# Patient Record
Sex: MALE | Race: WHITE | NOT HISPANIC OR LATINO | URBAN - METROPOLITAN AREA
[De-identification: names, ages, dates, MRNs, and addresses within clinical notes are randomized per-mention and may not be internally consistent; named-entity substitution may affect disease eponyms.]

---

## 2018-09-04 ENCOUNTER — INPATIENT (INPATIENT)
Facility: HOSPITAL | Age: 53
LOS: 2 days | Discharge: HOME | End: 2018-09-07
Attending: HOSPITALIST | Admitting: HOSPITALIST
Payer: COMMERCIAL

## 2018-09-04 VITALS
OXYGEN SATURATION: 96 % | TEMPERATURE: 99 F | SYSTOLIC BLOOD PRESSURE: 179 MMHG | DIASTOLIC BLOOD PRESSURE: 137 MMHG | WEIGHT: 199.96 LBS | RESPIRATION RATE: 16 BRPM | HEART RATE: 117 BPM | HEIGHT: 71 IN

## 2018-09-04 DIAGNOSIS — K57.92 DIVERTICULITIS OF INTESTINE, PART UNSPECIFIED, WITHOUT PERFORATION OR ABSCESS WITHOUT BLEEDING: ICD-10-CM

## 2018-09-04 DIAGNOSIS — I10 ESSENTIAL (PRIMARY) HYPERTENSION: ICD-10-CM

## 2018-09-04 LAB
ALBUMIN SERPL ELPH-MCNC: 3.5 G/DL — SIGNIFICANT CHANGE UP (ref 3.3–5)
ALP SERPL-CCNC: 58 U/L — SIGNIFICANT CHANGE UP (ref 40–120)
ALT FLD-CCNC: 20 U/L — SIGNIFICANT CHANGE UP (ref 12–78)
AMYLASE P1 CFR SERPL: 41 U/L — SIGNIFICANT CHANGE UP (ref 25–115)
ANION GAP SERPL CALC-SCNC: 10 MMOL/L — SIGNIFICANT CHANGE UP (ref 5–17)
APPEARANCE UR: CLEAR — SIGNIFICANT CHANGE UP
APTT BLD: 28.8 SEC — SIGNIFICANT CHANGE UP (ref 27.5–37.4)
AST SERPL-CCNC: 7 U/L — LOW (ref 15–37)
BASOPHILS # BLD AUTO: 0.03 K/UL — SIGNIFICANT CHANGE UP (ref 0–0.2)
BASOPHILS NFR BLD AUTO: 0.2 % — SIGNIFICANT CHANGE UP (ref 0–2)
BILIRUB DIRECT SERPL-MCNC: 0.2 MG/DL — SIGNIFICANT CHANGE UP (ref 0.05–0.2)
BILIRUB INDIRECT FLD-MCNC: 0.6 MG/DL — SIGNIFICANT CHANGE UP (ref 0.2–1)
BILIRUB SERPL-MCNC: 0.8 MG/DL — SIGNIFICANT CHANGE UP (ref 0.2–1.2)
BILIRUB UR-MCNC: NEGATIVE — SIGNIFICANT CHANGE UP
BUN SERPL-MCNC: 17 MG/DL — SIGNIFICANT CHANGE UP (ref 7–23)
CALCIUM SERPL-MCNC: 9.1 MG/DL — SIGNIFICANT CHANGE UP (ref 8.5–10.1)
CHLORIDE SERPL-SCNC: 102 MMOL/L — SIGNIFICANT CHANGE UP (ref 96–108)
CK MB BLD-MCNC: 1.7 % — SIGNIFICANT CHANGE UP (ref 0–3.5)
CK MB CFR SERPL CALC: 2.3 NG/ML — SIGNIFICANT CHANGE UP (ref 0.5–3.6)
CK SERPL-CCNC: 136 U/L — SIGNIFICANT CHANGE UP (ref 26–308)
CO2 SERPL-SCNC: 26 MMOL/L — SIGNIFICANT CHANGE UP (ref 22–31)
COLOR SPEC: YELLOW — SIGNIFICANT CHANGE UP
CREAT SERPL-MCNC: 1.12 MG/DL — SIGNIFICANT CHANGE UP (ref 0.5–1.3)
DIFF PNL FLD: ABNORMAL
EOSINOPHIL # BLD AUTO: 0.04 K/UL — SIGNIFICANT CHANGE UP (ref 0–0.5)
EOSINOPHIL NFR BLD AUTO: 0.2 % — SIGNIFICANT CHANGE UP (ref 0–6)
EPI CELLS # UR: SIGNIFICANT CHANGE UP
GLUCOSE SERPL-MCNC: 114 MG/DL — HIGH (ref 70–99)
GLUCOSE UR QL: NEGATIVE MG/DL — SIGNIFICANT CHANGE UP
HCT VFR BLD CALC: 43.1 % — SIGNIFICANT CHANGE UP (ref 39–50)
HGB BLD-MCNC: 14.5 G/DL — SIGNIFICANT CHANGE UP (ref 13–17)
IMM GRANULOCYTES NFR BLD AUTO: 0.4 % — SIGNIFICANT CHANGE UP (ref 0–1.5)
INR BLD: 1.3 RATIO — HIGH (ref 0.88–1.16)
KETONES UR-MCNC: NEGATIVE — SIGNIFICANT CHANGE UP
LEUKOCYTE ESTERASE UR-ACNC: NEGATIVE — SIGNIFICANT CHANGE UP
LIDOCAIN IGE QN: 91 U/L — SIGNIFICANT CHANGE UP (ref 73–393)
LYMPHOCYTES # BLD AUTO: 1.4 K/UL — SIGNIFICANT CHANGE UP (ref 1–3.3)
LYMPHOCYTES # BLD AUTO: 7.8 % — LOW (ref 13–44)
MCHC RBC-ENTMCNC: 29.9 PG — SIGNIFICANT CHANGE UP (ref 27–34)
MCHC RBC-ENTMCNC: 33.6 GM/DL — SIGNIFICANT CHANGE UP (ref 32–36)
MCV RBC AUTO: 88.9 FL — SIGNIFICANT CHANGE UP (ref 80–100)
MONOCYTES # BLD AUTO: 1.38 K/UL — HIGH (ref 0–0.9)
MONOCYTES NFR BLD AUTO: 7.7 % — SIGNIFICANT CHANGE UP (ref 2–14)
NEUTROPHILS # BLD AUTO: 14.91 K/UL — HIGH (ref 1.8–7.4)
NEUTROPHILS NFR BLD AUTO: 83.7 % — HIGH (ref 43–77)
NITRITE UR-MCNC: NEGATIVE — SIGNIFICANT CHANGE UP
NRBC # BLD: 0 /100 WBCS — SIGNIFICANT CHANGE UP (ref 0–0)
PH UR: 5 — SIGNIFICANT CHANGE UP (ref 5–8)
PLATELET # BLD AUTO: 239 K/UL — SIGNIFICANT CHANGE UP (ref 150–400)
POTASSIUM SERPL-MCNC: 3.9 MMOL/L — SIGNIFICANT CHANGE UP (ref 3.5–5.3)
POTASSIUM SERPL-SCNC: 3.9 MMOL/L — SIGNIFICANT CHANGE UP (ref 3.5–5.3)
PROT SERPL-MCNC: 7.9 GM/DL — SIGNIFICANT CHANGE UP (ref 6–8.3)
PROT UR-MCNC: 30 MG/DL
PROTHROM AB SERPL-ACNC: 14.2 SEC — HIGH (ref 9.8–12.7)
RBC # BLD: 4.85 M/UL — SIGNIFICANT CHANGE UP (ref 4.2–5.8)
RBC # FLD: 13.2 % — SIGNIFICANT CHANGE UP (ref 10.3–14.5)
RBC CASTS # UR COMP ASSIST: ABNORMAL /HPF (ref 0–4)
SODIUM SERPL-SCNC: 138 MMOL/L — SIGNIFICANT CHANGE UP (ref 135–145)
SP GR SPEC: 1.01 — SIGNIFICANT CHANGE UP (ref 1.01–1.02)
TROPONIN I SERPL-MCNC: <.015 NG/ML — SIGNIFICANT CHANGE UP (ref 0.01–0.04)
UROBILINOGEN FLD QL: NEGATIVE MG/DL — SIGNIFICANT CHANGE UP
WBC # BLD: 17.84 K/UL — HIGH (ref 3.8–10.5)
WBC # FLD AUTO: 17.84 K/UL — HIGH (ref 3.8–10.5)
WBC UR QL: SIGNIFICANT CHANGE UP

## 2018-09-04 PROCEDURE — 93010 ELECTROCARDIOGRAM REPORT: CPT

## 2018-09-04 PROCEDURE — 99285 EMERGENCY DEPT VISIT HI MDM: CPT | Mod: 25

## 2018-09-04 PROCEDURE — 71045 X-RAY EXAM CHEST 1 VIEW: CPT | Mod: 26

## 2018-09-04 PROCEDURE — 74177 CT ABD & PELVIS W/CONTRAST: CPT | Mod: 26

## 2018-09-04 PROCEDURE — 70450 CT HEAD/BRAIN W/O DYE: CPT | Mod: 26

## 2018-09-04 PROCEDURE — 99053 MED SERV 10PM-8AM 24 HR FAC: CPT

## 2018-09-04 RX ORDER — IBUPROFEN 200 MG
1 TABLET ORAL
Qty: 40 | Refills: 0 | OUTPATIENT
Start: 2018-09-04 | End: 2018-09-13

## 2018-09-04 RX ORDER — CIPROFLOXACIN LACTATE 400MG/40ML
400 VIAL (ML) INTRAVENOUS EVERY 12 HOURS
Qty: 0 | Refills: 0 | Status: DISCONTINUED | OUTPATIENT
Start: 2018-09-04 | End: 2018-09-05

## 2018-09-04 RX ORDER — ONDANSETRON 8 MG/1
4 TABLET, FILM COATED ORAL ONCE
Qty: 0 | Refills: 0 | Status: COMPLETED | OUTPATIENT
Start: 2018-09-04 | End: 2018-09-04

## 2018-09-04 RX ORDER — METOPROLOL TARTRATE 50 MG
1 TABLET ORAL
Qty: 120 | Refills: 0 | OUTPATIENT
Start: 2018-09-04 | End: 2018-11-02

## 2018-09-04 RX ORDER — MOXIFLOXACIN HYDROCHLORIDE TABLETS, 400 MG 400 MG/1
1 TABLET, FILM COATED ORAL
Qty: 14 | Refills: 0 | OUTPATIENT
Start: 2018-09-04 | End: 2018-09-10

## 2018-09-04 RX ORDER — METRONIDAZOLE 500 MG
500 TABLET ORAL ONCE
Qty: 0 | Refills: 0 | Status: COMPLETED | OUTPATIENT
Start: 2018-09-04 | End: 2018-09-04

## 2018-09-04 RX ORDER — LABETALOL HCL 100 MG
10 TABLET ORAL ONCE
Qty: 0 | Refills: 0 | Status: COMPLETED | OUTPATIENT
Start: 2018-09-04 | End: 2018-09-04

## 2018-09-04 RX ORDER — SODIUM CHLORIDE 9 MG/ML
1000 INJECTION INTRAMUSCULAR; INTRAVENOUS; SUBCUTANEOUS ONCE
Qty: 0 | Refills: 0 | Status: COMPLETED | OUTPATIENT
Start: 2018-09-04 | End: 2018-09-04

## 2018-09-04 RX ORDER — METRONIDAZOLE 500 MG
1 TABLET ORAL
Qty: 28 | Refills: 0 | OUTPATIENT
Start: 2018-09-04 | End: 2018-09-10

## 2018-09-04 RX ORDER — NICOTINE POLACRILEX 2 MG
1 GUM BUCCAL DAILY
Qty: 0 | Refills: 0 | Status: DISCONTINUED | OUTPATIENT
Start: 2018-09-04 | End: 2018-09-07

## 2018-09-04 RX ORDER — IBUPROFEN 200 MG
600 TABLET ORAL ONCE
Qty: 0 | Refills: 0 | Status: COMPLETED | OUTPATIENT
Start: 2018-09-04 | End: 2018-09-04

## 2018-09-04 RX ORDER — CIPROFLOXACIN LACTATE 400MG/40ML
400 VIAL (ML) INTRAVENOUS ONCE
Qty: 0 | Refills: 0 | Status: COMPLETED | OUTPATIENT
Start: 2018-09-04 | End: 2018-09-04

## 2018-09-04 RX ORDER — MORPHINE SULFATE 50 MG/1
4 CAPSULE, EXTENDED RELEASE ORAL ONCE
Qty: 0 | Refills: 0 | Status: DISCONTINUED | OUTPATIENT
Start: 2018-09-04 | End: 2018-09-04

## 2018-09-04 RX ORDER — HYDRALAZINE HCL 50 MG
10 TABLET ORAL ONCE
Qty: 0 | Refills: 0 | Status: COMPLETED | OUTPATIENT
Start: 2018-09-04 | End: 2018-09-04

## 2018-09-04 RX ORDER — MORPHINE SULFATE 50 MG/1
2 CAPSULE, EXTENDED RELEASE ORAL EVERY 4 HOURS
Qty: 0 | Refills: 0 | Status: DISCONTINUED | OUTPATIENT
Start: 2018-09-04 | End: 2018-09-05

## 2018-09-04 RX ORDER — METRONIDAZOLE 500 MG
500 TABLET ORAL EVERY 8 HOURS
Qty: 0 | Refills: 0 | Status: DISCONTINUED | OUTPATIENT
Start: 2018-09-04 | End: 2018-09-07

## 2018-09-04 RX ADMIN — MORPHINE SULFATE 4 MILLIGRAM(S): 50 CAPSULE, EXTENDED RELEASE ORAL at 09:21

## 2018-09-04 RX ADMIN — MORPHINE SULFATE 4 MILLIGRAM(S): 50 CAPSULE, EXTENDED RELEASE ORAL at 10:22

## 2018-09-04 RX ADMIN — Medication 200 MILLIGRAM(S): at 21:03

## 2018-09-04 RX ADMIN — Medication 100 MILLIGRAM(S): at 11:24

## 2018-09-04 RX ADMIN — Medication 400 MILLIGRAM(S): at 11:23

## 2018-09-04 RX ADMIN — SODIUM CHLORIDE 1000 MILLILITER(S): 9 INJECTION INTRAMUSCULAR; INTRAVENOUS; SUBCUTANEOUS at 08:11

## 2018-09-04 RX ADMIN — ONDANSETRON 4 MILLIGRAM(S): 8 TABLET, FILM COATED ORAL at 08:11

## 2018-09-04 RX ADMIN — ONDANSETRON 4 MILLIGRAM(S): 8 TABLET, FILM COATED ORAL at 10:22

## 2018-09-04 RX ADMIN — Medication 600 MILLIGRAM(S): at 16:20

## 2018-09-04 RX ADMIN — MORPHINE SULFATE 4 MILLIGRAM(S): 50 CAPSULE, EXTENDED RELEASE ORAL at 11:23

## 2018-09-04 RX ADMIN — Medication 600 MILLIGRAM(S): at 17:51

## 2018-09-04 RX ADMIN — SODIUM CHLORIDE 1000 MILLILITER(S): 9 INJECTION INTRAMUSCULAR; INTRAVENOUS; SUBCUTANEOUS at 09:21

## 2018-09-04 RX ADMIN — MORPHINE SULFATE 4 MILLIGRAM(S): 50 CAPSULE, EXTENDED RELEASE ORAL at 08:10

## 2018-09-04 RX ADMIN — Medication 10 MILLIGRAM(S): at 15:22

## 2018-09-04 RX ADMIN — Medication 100 MILLIGRAM(S): at 10:20

## 2018-09-04 RX ADMIN — Medication 100 MILLIGRAM(S): at 22:26

## 2018-09-04 RX ADMIN — Medication 10 MILLIGRAM(S): at 08:11

## 2018-09-04 RX ADMIN — Medication 10 MILLIGRAM(S): at 10:21

## 2018-09-04 RX ADMIN — Medication 500 MILLIGRAM(S): at 13:42

## 2018-09-04 NOTE — ED ADULT NURSE NOTE - OBJECTIVE STATEMENT
patient received, came here for lower abd pain x 3 days, LBM saturday, denies n/v, denies dizziness, denies burning/painful urination

## 2018-09-04 NOTE — ED ADULT NURSE NOTE - CHIEF COMPLAINT QUOTE
pt c/o lower abd pain since Sunday. denies N/V/D.   pt stated he felt bloated.  last regular BM was Saturday.

## 2018-09-04 NOTE — H&P ADULT - NSHPREVIEWOFSYSTEMS_GEN_ALL_CORE

## 2018-09-04 NOTE — H&P ADULT - HISTORY OF PRESENT ILLNESS
53 year old male presents today c/o abdominal pain which started since Sunday, he describes pain rated 7/10 in his lower abdomen associated with bloating and chills  (-) nausea or vomiting (-) h/o kidney stones (-) hematuria (-) urinary symptoms (-) fevers or chills 53 year old male presents today c/o abdominal pain which started since Sunday, he describes pain rated 7/10 in his lower abdomen associated with bloating and chills  (-) nausea or vomiting (-) h/o kidney stones (-) hematuria (-) urinary symptoms (-) fevers or chills-n the emergency room he was found to have markedly elevated b/p - denies chest pain or short of breath or headache or bright red blood per rectum

## 2018-09-04 NOTE — ED ADULT NURSE NOTE - NSIMPLEMENTINTERV_GEN_ALL_ED
Implemented All Universal Safety Interventions:  [place] to call system. Call bell, personal items and telephone within reach. Instruct patient to call for assistance. Room bathroom lighting operational. Non-slip footwear when patient is off stretcher. Physically safe environment: no spills, clutter or unnecessary equipment. Stretcher in lowest position, wheels locked, appropriate side rails in place.

## 2018-09-04 NOTE — H&P ADULT - ASSESSMENT
53m with diverticultis     IMPROVE VTE Individual Risk Assessment          RISK                                                          Points    [  ] Previous VTE                                                3    [  ] Thrombophilia                                             2    [  ] Lower limb paralysis                                    2        (unable to hold up >15 seconds)      [  ] Current Cancer                                             2         (within 6 months)    [  ] Immobilization > 24 hrs                              1    [  ] ICU/CCU stay > 24 hours                            1    [  ] Age > 60                                                    1    IMPROVE VTE Score __0_______

## 2018-09-04 NOTE — H&P ADULT - NSHPLABSRESULTS_GEN_ALL_CORE
14.5   17.84 )-----------( 239      ( 04 Sep 2018 07:52 )             43.1   09-04    138  |  102  |  17  ----------------------------<  114<H>  3.9   |  26  |  1.12    Ca    9.1      04 Sep 2018 07:52    TPro  7.9  /  Alb  3.5  /  TBili  0.8  /  DBili  .20  /  AST  7<L>  /  ALT  20  /  AlkPhos  58  09-04  < from: Xray Chest 1 View AP/PA. (09.04.18 @ 11:27) >    IMPRESSION:   No infiltrates.  < from: CT Abdomen and Pelvis w/ IV Cont (09.04.18 @ 09:35) >      Sigmoid colon diverticulitis with significant surrounding inflammatory   changes and reactive wall thickening of the cecum and terminal/distal   ileum. No drainable fluid collection or intraperitoneal free air.

## 2018-09-04 NOTE — ED PROVIDER NOTE - OBJECTIVE STATEMENT
53 year old male presents today c/o abdominal pain which started since Sunday, he describes pain rated 7/10 in his lower abdomen associated with bloating and chills  (-) nausea or vomiting (-) h/o kidney stones (-) hematuria (-) urinary symptoms (-) fevers or chills

## 2018-09-04 NOTE — H&P ADULT - NSHPPHYSICALEXAM_GEN_ALL_CORE
GENERAL: NAD well-developed  HEAD:  Atraumatic, Normocephalic  EYES: EOMI, PERRLA, conjunctiva and sclera clear  ENMT: No tonsillar erythema, exudates, or enlargement; Moist mucous membranes, Good dentition, No lesions  NECK: Supple, No JVD, Normal thyroid  NERVOUS SYSTEM:  Alert & Oriented X3, Good concentration; Motor Strength 5/5 B/L upper and lower extremities; DTRs 2+ intact and symmetric  CHEST/LUNG: Clear to percussion bilaterally; No rales, rhonchi, wheezing, or rubs  HEART: Regular rate and rhythm; No murmurs, rubs, or gallops  ABDOMEN: Soft, Nontender, Nondistended; Bowel sounds present  EXTREMITIES:  2+ Peripheral Pulses, No clubbing, cyanosis, or edema  LYMPH: No lymphadenopathy   SKIN: No rashes or lesions GENERAL: NAD well-developed  HEAD:  Atraumatic, Normocephalic  EYES: EOMI, PERRLA, conjunctiva and sclera clear  ENMT: No tonsillar erythema, exudates, or enlargement; Moist mucous membranes, Good dentition, No lesions  NECK: Supple, No JVD, Normal thyroid  NERVOUS SYSTEM:  Alert & Oriented X3, Good concentration; Motor Strength 5/5 B/L upper and lower extremities; DTRs 2+ intact and symmetric  CHEST/LUNG: Clear to percussion bilaterally; No rales, rhonchi, wheezing, or rubs  HEART: Regular rate and rhythm; No murmurs, rubs, or gallops  ABDOMEN: Soft, mildly tender to left lower quadrant , Nondistended; Bowel sounds present  EXTREMITIES:  2+ Peripheral Pulses, No clubbing, cyanosis, or edema  LYMPH: No lymphadenopathy   SKIN: No rashes or lesions

## 2018-09-05 DIAGNOSIS — Z29.9 ENCOUNTER FOR PROPHYLACTIC MEASURES, UNSPECIFIED: ICD-10-CM

## 2018-09-05 LAB
ALBUMIN SERPL ELPH-MCNC: 3 G/DL — LOW (ref 3.3–5)
ALP SERPL-CCNC: 49 U/L — SIGNIFICANT CHANGE UP (ref 40–120)
ALT FLD-CCNC: 16 U/L — SIGNIFICANT CHANGE UP (ref 12–78)
ANION GAP SERPL CALC-SCNC: 8 MMOL/L — SIGNIFICANT CHANGE UP (ref 5–17)
ANION GAP SERPL CALC-SCNC: 9 MMOL/L — SIGNIFICANT CHANGE UP (ref 5–17)
ANISOCYTOSIS BLD QL: SLIGHT — SIGNIFICANT CHANGE UP
APPEARANCE UR: CLEAR — SIGNIFICANT CHANGE UP
AST SERPL-CCNC: 5 U/L — LOW (ref 15–37)
BACTERIA # UR AUTO: ABNORMAL
BASOPHILS # BLD AUTO: 0 K/UL — SIGNIFICANT CHANGE UP (ref 0–0.2)
BASOPHILS NFR BLD AUTO: 0 % — SIGNIFICANT CHANGE UP (ref 0–2)
BILIRUB SERPL-MCNC: 0.7 MG/DL — SIGNIFICANT CHANGE UP (ref 0.2–1.2)
BILIRUB UR-MCNC: NEGATIVE — SIGNIFICANT CHANGE UP
BUN SERPL-MCNC: 15 MG/DL — SIGNIFICANT CHANGE UP (ref 7–23)
BUN SERPL-MCNC: 16 MG/DL — SIGNIFICANT CHANGE UP (ref 7–23)
CALCIUM SERPL-MCNC: 8.5 MG/DL — SIGNIFICANT CHANGE UP (ref 8.5–10.1)
CALCIUM SERPL-MCNC: 8.7 MG/DL — SIGNIFICANT CHANGE UP (ref 8.5–10.1)
CHLORIDE SERPL-SCNC: 103 MMOL/L — SIGNIFICANT CHANGE UP (ref 96–108)
CHLORIDE SERPL-SCNC: 103 MMOL/L — SIGNIFICANT CHANGE UP (ref 96–108)
CO2 SERPL-SCNC: 27 MMOL/L — SIGNIFICANT CHANGE UP (ref 22–31)
CO2 SERPL-SCNC: 28 MMOL/L — SIGNIFICANT CHANGE UP (ref 22–31)
COLOR SPEC: YELLOW — SIGNIFICANT CHANGE UP
CREAT SERPL-MCNC: 1.08 MG/DL — SIGNIFICANT CHANGE UP (ref 0.5–1.3)
CREAT SERPL-MCNC: 1.23 MG/DL — SIGNIFICANT CHANGE UP (ref 0.5–1.3)
CULTURE RESULTS: NO GROWTH — SIGNIFICANT CHANGE UP
DIFF PNL FLD: ABNORMAL
EOSINOPHIL # BLD AUTO: 0 K/UL — SIGNIFICANT CHANGE UP (ref 0–0.5)
EOSINOPHIL NFR BLD AUTO: 0 % — SIGNIFICANT CHANGE UP (ref 0–6)
GLUCOSE BLDC GLUCOMTR-MCNC: 113 MG/DL — HIGH (ref 70–99)
GLUCOSE SERPL-MCNC: 102 MG/DL — HIGH (ref 70–99)
GLUCOSE SERPL-MCNC: 147 MG/DL — HIGH (ref 70–99)
GLUCOSE UR QL: NEGATIVE MG/DL — SIGNIFICANT CHANGE UP
HCT VFR BLD CALC: 39.3 % — SIGNIFICANT CHANGE UP (ref 39–50)
HCT VFR BLD CALC: 39.5 % — SIGNIFICANT CHANGE UP (ref 39–50)
HGB BLD-MCNC: 13 G/DL — SIGNIFICANT CHANGE UP (ref 13–17)
HGB BLD-MCNC: 13.3 G/DL — SIGNIFICANT CHANGE UP (ref 13–17)
KETONES UR-MCNC: NEGATIVE — SIGNIFICANT CHANGE UP
LACTATE SERPL-SCNC: 1.1 MMOL/L — SIGNIFICANT CHANGE UP (ref 0.7–2)
LACTATE SERPL-SCNC: 1.3 MMOL/L — SIGNIFICANT CHANGE UP (ref 0.7–2)
LACTATE SERPL-SCNC: 1.3 MMOL/L — SIGNIFICANT CHANGE UP (ref 0.7–2)
LEUKOCYTE ESTERASE UR-ACNC: ABNORMAL
LYMPHOCYTES # BLD AUTO: 1.38 K/UL — SIGNIFICANT CHANGE UP (ref 1–3.3)
LYMPHOCYTES # BLD AUTO: 16 % — SIGNIFICANT CHANGE UP (ref 13–44)
MANUAL SMEAR VERIFICATION: SIGNIFICANT CHANGE UP
MCHC RBC-ENTMCNC: 29.7 PG — SIGNIFICANT CHANGE UP (ref 27–34)
MCHC RBC-ENTMCNC: 30.2 PG — SIGNIFICANT CHANGE UP (ref 27–34)
MCHC RBC-ENTMCNC: 33.1 GM/DL — SIGNIFICANT CHANGE UP (ref 32–36)
MCHC RBC-ENTMCNC: 33.7 GM/DL — SIGNIFICANT CHANGE UP (ref 32–36)
MCV RBC AUTO: 89.6 FL — SIGNIFICANT CHANGE UP (ref 80–100)
MCV RBC AUTO: 89.9 FL — SIGNIFICANT CHANGE UP (ref 80–100)
MONOCYTES # BLD AUTO: 0.26 K/UL — SIGNIFICANT CHANGE UP (ref 0–0.9)
MONOCYTES NFR BLD AUTO: 3 % — SIGNIFICANT CHANGE UP (ref 2–14)
NEUTROPHILS # BLD AUTO: 6.97 K/UL — SIGNIFICANT CHANGE UP (ref 1.8–7.4)
NEUTROPHILS NFR BLD AUTO: 64 % — SIGNIFICANT CHANGE UP (ref 43–77)
NEUTS BAND # BLD: 17 % — HIGH (ref 0–8)
NITRITE UR-MCNC: NEGATIVE — SIGNIFICANT CHANGE UP
NRBC # BLD: 0 /100 WBCS — SIGNIFICANT CHANGE UP (ref 0–0)
NRBC # BLD: 0 /100 — SIGNIFICANT CHANGE UP (ref 0–0)
NRBC # BLD: SIGNIFICANT CHANGE UP /100 WBCS (ref 0–0)
PH UR: 5 — SIGNIFICANT CHANGE UP (ref 5–8)
PLAT MORPH BLD: NORMAL — SIGNIFICANT CHANGE UP
PLATELET # BLD AUTO: 202 K/UL — SIGNIFICANT CHANGE UP (ref 150–400)
PLATELET # BLD AUTO: 203 K/UL — SIGNIFICANT CHANGE UP (ref 150–400)
POTASSIUM SERPL-MCNC: 4.3 MMOL/L — SIGNIFICANT CHANGE UP (ref 3.5–5.3)
POTASSIUM SERPL-MCNC: 4.4 MMOL/L — SIGNIFICANT CHANGE UP (ref 3.5–5.3)
POTASSIUM SERPL-SCNC: 4.3 MMOL/L — SIGNIFICANT CHANGE UP (ref 3.5–5.3)
POTASSIUM SERPL-SCNC: 4.4 MMOL/L — SIGNIFICANT CHANGE UP (ref 3.5–5.3)
PROT SERPL-MCNC: 7.3 GM/DL — SIGNIFICANT CHANGE UP (ref 6–8.3)
PROT UR-MCNC: 30 MG/DL
RBC # BLD: 4.37 M/UL — SIGNIFICANT CHANGE UP (ref 4.2–5.8)
RBC # BLD: 4.41 M/UL — SIGNIFICANT CHANGE UP (ref 4.2–5.8)
RBC # FLD: 13.2 % — SIGNIFICANT CHANGE UP (ref 10.3–14.5)
RBC # FLD: 13.2 % — SIGNIFICANT CHANGE UP (ref 10.3–14.5)
RBC BLD AUTO: SIGNIFICANT CHANGE UP
RBC CASTS # UR COMP ASSIST: SIGNIFICANT CHANGE UP /HPF (ref 0–4)
SODIUM SERPL-SCNC: 138 MMOL/L — SIGNIFICANT CHANGE UP (ref 135–145)
SODIUM SERPL-SCNC: 140 MMOL/L — SIGNIFICANT CHANGE UP (ref 135–145)
SP GR SPEC: 1.02 — SIGNIFICANT CHANGE UP (ref 1.01–1.02)
SPECIMEN SOURCE: SIGNIFICANT CHANGE UP
TROPONIN I SERPL-MCNC: <.015 NG/ML — SIGNIFICANT CHANGE UP (ref 0.01–0.04)
UROBILINOGEN FLD QL: NEGATIVE MG/DL — SIGNIFICANT CHANGE UP
WBC # BLD: 15.64 K/UL — HIGH (ref 3.8–10.5)
WBC # BLD: 8.6 K/UL — SIGNIFICANT CHANGE UP (ref 3.8–10.5)
WBC # FLD AUTO: 15.64 K/UL — HIGH (ref 3.8–10.5)
WBC # FLD AUTO: 8.6 K/UL — SIGNIFICANT CHANGE UP (ref 3.8–10.5)
WBC UR QL: SIGNIFICANT CHANGE UP

## 2018-09-05 PROCEDURE — 99233 SBSQ HOSP IP/OBS HIGH 50: CPT

## 2018-09-05 RX ORDER — AMLODIPINE BESYLATE 2.5 MG/1
10 TABLET ORAL DAILY
Qty: 0 | Refills: 0 | Status: DISCONTINUED | OUTPATIENT
Start: 2018-09-06 | End: 2018-09-07

## 2018-09-05 RX ORDER — HYDROMORPHONE HYDROCHLORIDE 2 MG/ML
2 INJECTION INTRAMUSCULAR; INTRAVENOUS; SUBCUTANEOUS EVERY 4 HOURS
Qty: 0 | Refills: 0 | Status: DISCONTINUED | OUTPATIENT
Start: 2018-09-05 | End: 2018-09-07

## 2018-09-05 RX ORDER — HYDROMORPHONE HYDROCHLORIDE 2 MG/ML
1 INJECTION INTRAMUSCULAR; INTRAVENOUS; SUBCUTANEOUS EVERY 4 HOURS
Qty: 0 | Refills: 0 | Status: DISCONTINUED | OUTPATIENT
Start: 2018-09-05 | End: 2018-09-05

## 2018-09-05 RX ORDER — AMLODIPINE BESYLATE 2.5 MG/1
5 TABLET ORAL ONCE
Qty: 0 | Refills: 0 | Status: COMPLETED | OUTPATIENT
Start: 2018-09-05 | End: 2018-09-05

## 2018-09-05 RX ORDER — SODIUM CHLORIDE 9 MG/ML
1000 INJECTION INTRAMUSCULAR; INTRAVENOUS; SUBCUTANEOUS
Qty: 0 | Refills: 0 | Status: DISCONTINUED | OUTPATIENT
Start: 2018-09-05 | End: 2018-09-05

## 2018-09-05 RX ORDER — ONDANSETRON 8 MG/1
4 TABLET, FILM COATED ORAL EVERY 6 HOURS
Qty: 0 | Refills: 0 | Status: DISCONTINUED | OUTPATIENT
Start: 2018-09-05 | End: 2018-09-07

## 2018-09-05 RX ORDER — HYDROMORPHONE HYDROCHLORIDE 2 MG/ML
1 INJECTION INTRAMUSCULAR; INTRAVENOUS; SUBCUTANEOUS ONCE
Qty: 0 | Refills: 0 | Status: DISCONTINUED | OUTPATIENT
Start: 2018-09-05 | End: 2018-09-05

## 2018-09-05 RX ORDER — OXYCODONE AND ACETAMINOPHEN 5; 325 MG/1; MG/1
2 TABLET ORAL EVERY 4 HOURS
Qty: 0 | Refills: 0 | Status: DISCONTINUED | OUTPATIENT
Start: 2018-09-05 | End: 2018-09-07

## 2018-09-05 RX ORDER — SODIUM CHLORIDE 9 MG/ML
1000 INJECTION, SOLUTION INTRAVENOUS
Qty: 0 | Refills: 0 | Status: DISCONTINUED | OUTPATIENT
Start: 2018-09-05 | End: 2018-09-06

## 2018-09-05 RX ORDER — SODIUM CHLORIDE 9 MG/ML
500 INJECTION INTRAMUSCULAR; INTRAVENOUS; SUBCUTANEOUS ONCE
Qty: 0 | Refills: 0 | Status: COMPLETED | OUTPATIENT
Start: 2018-09-05 | End: 2018-09-05

## 2018-09-05 RX ORDER — AMLODIPINE BESYLATE 2.5 MG/1
5 TABLET ORAL DAILY
Qty: 0 | Refills: 0 | Status: DISCONTINUED | OUTPATIENT
Start: 2018-09-05 | End: 2018-09-05

## 2018-09-05 RX ORDER — ACETAMINOPHEN 500 MG
650 TABLET ORAL EVERY 6 HOURS
Qty: 0 | Refills: 0 | Status: DISCONTINUED | OUTPATIENT
Start: 2018-09-05 | End: 2018-09-07

## 2018-09-05 RX ORDER — PIPERACILLIN AND TAZOBACTAM 4; .5 G/20ML; G/20ML
3.38 INJECTION, POWDER, LYOPHILIZED, FOR SOLUTION INTRAVENOUS EVERY 8 HOURS
Qty: 0 | Refills: 0 | Status: DISCONTINUED | OUTPATIENT
Start: 2018-09-05 | End: 2018-09-07

## 2018-09-05 RX ADMIN — OXYCODONE AND ACETAMINOPHEN 2 TABLET(S): 5; 325 TABLET ORAL at 16:16

## 2018-09-05 RX ADMIN — Medication 650 MILLIGRAM(S): at 12:51

## 2018-09-05 RX ADMIN — Medication 100 MILLIGRAM(S): at 21:22

## 2018-09-05 RX ADMIN — Medication 100 MILLIGRAM(S): at 05:41

## 2018-09-05 RX ADMIN — SODIUM CHLORIDE 75 MILLILITER(S): 9 INJECTION, SOLUTION INTRAVENOUS at 18:14

## 2018-09-05 RX ADMIN — MORPHINE SULFATE 2 MILLIGRAM(S): 50 CAPSULE, EXTENDED RELEASE ORAL at 07:18

## 2018-09-05 RX ADMIN — Medication 200 MILLIGRAM(S): at 10:42

## 2018-09-05 RX ADMIN — HYDROMORPHONE HYDROCHLORIDE 1 MILLIGRAM(S): 2 INJECTION INTRAMUSCULAR; INTRAVENOUS; SUBCUTANEOUS at 07:42

## 2018-09-05 RX ADMIN — AMLODIPINE BESYLATE 5 MILLIGRAM(S): 2.5 TABLET ORAL at 13:10

## 2018-09-05 RX ADMIN — OXYCODONE AND ACETAMINOPHEN 2 TABLET(S): 5; 325 TABLET ORAL at 17:12

## 2018-09-05 RX ADMIN — SODIUM CHLORIDE 1000 MILLILITER(S): 9 INJECTION INTRAMUSCULAR; INTRAVENOUS; SUBCUTANEOUS at 07:21

## 2018-09-05 RX ADMIN — SODIUM CHLORIDE 100 MILLILITER(S): 9 INJECTION INTRAMUSCULAR; INTRAVENOUS; SUBCUTANEOUS at 10:42

## 2018-09-05 RX ADMIN — AMLODIPINE BESYLATE 5 MILLIGRAM(S): 2.5 TABLET ORAL at 19:23

## 2018-09-05 RX ADMIN — Medication 650 MILLIGRAM(S): at 15:17

## 2018-09-05 RX ADMIN — PIPERACILLIN AND TAZOBACTAM 25 GRAM(S): 4; .5 INJECTION, POWDER, LYOPHILIZED, FOR SOLUTION INTRAVENOUS at 14:47

## 2018-09-05 RX ADMIN — ONDANSETRON 4 MILLIGRAM(S): 8 TABLET, FILM COATED ORAL at 08:34

## 2018-09-05 RX ADMIN — PIPERACILLIN AND TAZOBACTAM 25 GRAM(S): 4; .5 INJECTION, POWDER, LYOPHILIZED, FOR SOLUTION INTRAVENOUS at 21:24

## 2018-09-05 RX ADMIN — HYDROMORPHONE HYDROCHLORIDE 1 MILLIGRAM(S): 2 INJECTION INTRAMUSCULAR; INTRAVENOUS; SUBCUTANEOUS at 08:20

## 2018-09-05 RX ADMIN — HYDROMORPHONE HYDROCHLORIDE 1 MILLIGRAM(S): 2 INJECTION INTRAMUSCULAR; INTRAVENOUS; SUBCUTANEOUS at 12:27

## 2018-09-05 RX ADMIN — HYDROMORPHONE HYDROCHLORIDE 1 MILLIGRAM(S): 2 INJECTION INTRAMUSCULAR; INTRAVENOUS; SUBCUTANEOUS at 11:57

## 2018-09-05 RX ADMIN — Medication 100 MILLIGRAM(S): at 14:47

## 2018-09-05 RX ADMIN — MORPHINE SULFATE 2 MILLIGRAM(S): 50 CAPSULE, EXTENDED RELEASE ORAL at 07:02

## 2018-09-05 NOTE — CHART NOTE - NSCHARTNOTEFT_GEN_A_CORE
Medicine Pa Note    RRT called for Hypotension , diaphoretic and increased abdominal pain. Patient seen and examined at bedside. Patient stated that pain is 10/10 localized to lower right and left quadrants. As per patient , he was attempting to urinate and developed increased pain however urinary flow was weaker than normal. Patient denies cp , palp, sob, nausea, constipation, dysuria, cold, or cough.    VS Medicine Pa Note    RRT called for Hypotension , diaphoretic and increased abdominal pain. Patient seen and examined at bedside. Patient stated that pain is 10/10 localized to lower right and left quadrants. As per patient , he was attempting to urinate and developed increased pain however urinary flow was weaker than normal. Patient denies cp , palp, sob, nausea, constipation, dysuria, cold, or cough.    VS Vital Signs Last 24 Hrs  T(C): 36.4 (05 Sep 2018 07:10), Max: 38.1 (04 Sep 2018 16:20)  T(F): 97.5 (05 Sep 2018 07:10), Max: 100.5 (04 Sep 2018 16:20)  HR: 75 (05 Sep 2018 07:10) (75 - 103)  BP: 104/59 (05 Sep 2018 07:10) (104/59 - 169/115)  BP(mean): --  RR: 20 (05 Sep 2018 07:10) (15 - 20)  SpO2: 99% (05 Sep 2018 07:10) (97% - 99%)    LABS   Lactate, Blood: 1.3 mmol/L (09.05.18 @ 07:58)                                           Troponin I, Serum: <.015: The new reference range for Troponin-I performed on the Siemens                           13.3   15.64 )-----------( 202      ( 05 Sep 2018 06:11 )                                                          09-05    140  |  103  |  15  ----------------------------<  102<H>  4.4   |  28  |  1.08    Ca    8.7      05 Sep 2018 06:11    TPro  7.9  /  Alb  3.5  /  TBili  0.8  /  DBili  .20  /  AST  7<L>  /  ALT  20  /  AlkPhos  58  09-04                 39.5     Sigmoid colon diverticulitis with significant surrounding inflammatory   	changes and reactive wall thickening of the cecum and terminal/distal   ileum. No drainable fluid collection or intraperitoneal free air.    Gen Patient is alert, awake and oriented x 3 , moderate distress secondary to abdominal pain  CV s1 s2 RRR   Lungs CTA b/l no wheezing, no rales no rhonchi  Abd ttp rt and left lower quadrants, BS positive.  EXt no calf tenderness no edema    a/p 53 yr old male with PMHX of HTN . Patient found and admitted for abd pain/diverticulitis currently on cipro and flagyl  will check lactate and trop  will give NS bolus 500cc over 30 minutes with NS 100cc/hr  dilaudid for pain   zofran for nausea  bedside bladder sonongram which revealed only 187 urine retention.  appreciated GI note  Dr alexander at [facility]  will continue to monitor  MBraito RPAC    Addendum   Patient resting comfortably after dilaudid . Pain 5-6. Nausea resolved.  will continue to monitor  MBraito RPAc

## 2018-09-06 LAB
ALBUMIN SERPL ELPH-MCNC: 2.6 G/DL — LOW (ref 3.3–5)
ALP SERPL-CCNC: 48 U/L — SIGNIFICANT CHANGE UP (ref 40–120)
ALT FLD-CCNC: 14 U/L — SIGNIFICANT CHANGE UP (ref 12–78)
ANION GAP SERPL CALC-SCNC: 10 MMOL/L — SIGNIFICANT CHANGE UP (ref 5–17)
AST SERPL-CCNC: 9 U/L — LOW (ref 15–37)
BASOPHILS # BLD AUTO: 0.02 K/UL — SIGNIFICANT CHANGE UP (ref 0–0.2)
BASOPHILS NFR BLD AUTO: 0.2 % — SIGNIFICANT CHANGE UP (ref 0–2)
BILIRUB SERPL-MCNC: 0.7 MG/DL — SIGNIFICANT CHANGE UP (ref 0.2–1.2)
BUN SERPL-MCNC: 15 MG/DL — SIGNIFICANT CHANGE UP (ref 7–23)
CALCIUM SERPL-MCNC: 8.8 MG/DL — SIGNIFICANT CHANGE UP (ref 8.5–10.1)
CHLORIDE SERPL-SCNC: 104 MMOL/L — SIGNIFICANT CHANGE UP (ref 96–108)
CO2 SERPL-SCNC: 25 MMOL/L — SIGNIFICANT CHANGE UP (ref 22–31)
CREAT SERPL-MCNC: 0.98 MG/DL — SIGNIFICANT CHANGE UP (ref 0.5–1.3)
CRP SERPL-MCNC: 23.49 MG/DL — HIGH (ref 0–0.4)
CULTURE RESULTS: NO GROWTH — SIGNIFICANT CHANGE UP
EOSINOPHIL # BLD AUTO: 0.04 K/UL — SIGNIFICANT CHANGE UP (ref 0–0.5)
EOSINOPHIL NFR BLD AUTO: 0.5 % — SIGNIFICANT CHANGE UP (ref 0–6)
ERYTHROCYTE [SEDIMENTATION RATE] IN BLOOD: 62 MM/HR — HIGH (ref 0–20)
GLUCOSE SERPL-MCNC: 100 MG/DL — HIGH (ref 70–99)
HCT VFR BLD CALC: 37.4 % — LOW (ref 39–50)
HGB BLD-MCNC: 12.2 G/DL — LOW (ref 13–17)
IMM GRANULOCYTES NFR BLD AUTO: 0.2 % — SIGNIFICANT CHANGE UP (ref 0–1.5)
LYMPHOCYTES # BLD AUTO: 1.09 K/UL — SIGNIFICANT CHANGE UP (ref 1–3.3)
LYMPHOCYTES # BLD AUTO: 12.7 % — LOW (ref 13–44)
MAGNESIUM SERPL-MCNC: 2.1 MG/DL — SIGNIFICANT CHANGE UP (ref 1.6–2.6)
MCHC RBC-ENTMCNC: 29.4 PG — SIGNIFICANT CHANGE UP (ref 27–34)
MCHC RBC-ENTMCNC: 32.6 GM/DL — SIGNIFICANT CHANGE UP (ref 32–36)
MCV RBC AUTO: 90.1 FL — SIGNIFICANT CHANGE UP (ref 80–100)
MONOCYTES # BLD AUTO: 0.64 K/UL — SIGNIFICANT CHANGE UP (ref 0–0.9)
MONOCYTES NFR BLD AUTO: 7.5 % — SIGNIFICANT CHANGE UP (ref 2–14)
NEUTROPHILS # BLD AUTO: 6.76 K/UL — SIGNIFICANT CHANGE UP (ref 1.8–7.4)
NEUTROPHILS NFR BLD AUTO: 78.9 % — HIGH (ref 43–77)
NRBC # BLD: 0 /100 WBCS — SIGNIFICANT CHANGE UP (ref 0–0)
PHOSPHATE SERPL-MCNC: 2.4 MG/DL — LOW (ref 2.5–4.5)
PLATELET # BLD AUTO: 201 K/UL — SIGNIFICANT CHANGE UP (ref 150–400)
POTASSIUM SERPL-MCNC: 3.8 MMOL/L — SIGNIFICANT CHANGE UP (ref 3.5–5.3)
POTASSIUM SERPL-SCNC: 3.8 MMOL/L — SIGNIFICANT CHANGE UP (ref 3.5–5.3)
PROT SERPL-MCNC: 6.7 GM/DL — SIGNIFICANT CHANGE UP (ref 6–8.3)
RBC # BLD: 4.15 M/UL — LOW (ref 4.2–5.8)
RBC # FLD: 13.2 % — SIGNIFICANT CHANGE UP (ref 10.3–14.5)
SODIUM SERPL-SCNC: 139 MMOL/L — SIGNIFICANT CHANGE UP (ref 135–145)
SPECIMEN SOURCE: SIGNIFICANT CHANGE UP
WBC # BLD: 8.57 K/UL — SIGNIFICANT CHANGE UP (ref 3.8–10.5)
WBC # FLD AUTO: 8.57 K/UL — SIGNIFICANT CHANGE UP (ref 3.8–10.5)

## 2018-09-06 PROCEDURE — 99233 SBSQ HOSP IP/OBS HIGH 50: CPT

## 2018-09-06 RX ORDER — SODIUM,POTASSIUM PHOSPHATES 278-250MG
1 POWDER IN PACKET (EA) ORAL ONCE
Qty: 0 | Refills: 0 | Status: COMPLETED | OUTPATIENT
Start: 2018-09-06 | End: 2018-09-06

## 2018-09-06 RX ORDER — PANTOPRAZOLE SODIUM 20 MG/1
40 TABLET, DELAYED RELEASE ORAL
Qty: 0 | Refills: 0 | Status: DISCONTINUED | OUTPATIENT
Start: 2018-09-06 | End: 2018-09-07

## 2018-09-06 RX ORDER — METOPROLOL TARTRATE 50 MG
50 TABLET ORAL
Qty: 0 | Refills: 0 | Status: DISCONTINUED | OUTPATIENT
Start: 2018-09-06 | End: 2018-09-07

## 2018-09-06 RX ORDER — SODIUM,POTASSIUM PHOSPHATES 278-250MG
2 POWDER IN PACKET (EA) ORAL ONCE
Qty: 0 | Refills: 0 | Status: DISCONTINUED | OUTPATIENT
Start: 2018-09-06 | End: 2018-09-06

## 2018-09-06 RX ORDER — LACTULOSE 10 G/15ML
10 SOLUTION ORAL ONCE
Qty: 0 | Refills: 0 | Status: COMPLETED | OUTPATIENT
Start: 2018-09-06 | End: 2018-09-06

## 2018-09-06 RX ORDER — METOPROLOL TARTRATE 50 MG
25 TABLET ORAL
Qty: 0 | Refills: 0 | Status: DISCONTINUED | OUTPATIENT
Start: 2018-09-06 | End: 2018-09-06

## 2018-09-06 RX ORDER — POTASSIUM PHOSPHATE, MONOBASIC POTASSIUM PHOSPHATE, DIBASIC 236; 224 MG/ML; MG/ML
15 INJECTION, SOLUTION INTRAVENOUS ONCE
Qty: 0 | Refills: 0 | Status: COMPLETED | OUTPATIENT
Start: 2018-09-06 | End: 2018-09-06

## 2018-09-06 RX ADMIN — OXYCODONE AND ACETAMINOPHEN 2 TABLET(S): 5; 325 TABLET ORAL at 05:26

## 2018-09-06 RX ADMIN — POTASSIUM PHOSPHATE, MONOBASIC POTASSIUM PHOSPHATE, DIBASIC 63.75 MILLIMOLE(S): 236; 224 INJECTION, SOLUTION INTRAVENOUS at 10:34

## 2018-09-06 RX ADMIN — SODIUM CHLORIDE 75 MILLILITER(S): 9 INJECTION, SOLUTION INTRAVENOUS at 10:41

## 2018-09-06 RX ADMIN — Medication 100 MILLIGRAM(S): at 21:47

## 2018-09-06 RX ADMIN — AMLODIPINE BESYLATE 10 MILLIGRAM(S): 2.5 TABLET ORAL at 05:27

## 2018-09-06 RX ADMIN — Medication 50 MILLIGRAM(S): at 19:11

## 2018-09-06 RX ADMIN — Medication 1 TABLET(S): at 14:36

## 2018-09-06 RX ADMIN — LACTULOSE 10 GRAM(S): 10 SOLUTION ORAL at 14:34

## 2018-09-06 RX ADMIN — Medication 10 MILLIGRAM(S): at 17:06

## 2018-09-06 RX ADMIN — Medication 100 MILLIGRAM(S): at 05:28

## 2018-09-06 RX ADMIN — PIPERACILLIN AND TAZOBACTAM 25 GRAM(S): 4; .5 INJECTION, POWDER, LYOPHILIZED, FOR SOLUTION INTRAVENOUS at 06:54

## 2018-09-06 RX ADMIN — OXYCODONE AND ACETAMINOPHEN 2 TABLET(S): 5; 325 TABLET ORAL at 06:26

## 2018-09-06 RX ADMIN — Medication 650 MILLIGRAM(S): at 16:45

## 2018-09-06 RX ADMIN — PIPERACILLIN AND TAZOBACTAM 25 GRAM(S): 4; .5 INJECTION, POWDER, LYOPHILIZED, FOR SOLUTION INTRAVENOUS at 21:46

## 2018-09-06 RX ADMIN — Medication 100 MILLIGRAM(S): at 14:36

## 2018-09-06 RX ADMIN — PIPERACILLIN AND TAZOBACTAM 25 GRAM(S): 4; .5 INJECTION, POWDER, LYOPHILIZED, FOR SOLUTION INTRAVENOUS at 15:17

## 2018-09-06 RX ADMIN — Medication 25 MILLIGRAM(S): at 12:51

## 2018-09-06 RX ADMIN — Medication 650 MILLIGRAM(S): at 17:15

## 2018-09-06 NOTE — PROGRESS NOTE ADULT - PROBLEM SELECTOR PLAN 1
Zosyn and Flagyl IV  gastroenterologist consult appreciated   Clear liquids will advance as tolerated  IV Fluid hydration  will need colonoscopy as outpatient
Zosyn and Flagyl IV  gastroenterologist consult appreciated   now on diet   IV Fluid hydration  will need colonoscopy as outpatient

## 2018-09-06 NOTE — PROGRESS NOTE ADULT - PROBLEM SELECTOR PLAN 2
uncontrolled with high diastolic continue to monitor   Norvasc 5mg po Daily
uncontrolled with high diastolic continue to monitor   Norvasc 10mg po Daily  Metoprolol 25mg po BID

## 2018-09-06 NOTE — PROGRESS NOTE ADULT - ASSESSMENT
53 M with diverticulitis fe4ver and pain may suggest complicated course. On antibiotics  with pain control. Appreciate GI consult
53 M with diverticulitis fe4ver and pain may suggest complicated course. On antibiotics  with pain control. Appreciate GI consult   improved today high makers of inflammation will need colonoscopy with biopsy as outpatient

## 2018-09-07 VITALS
RESPIRATION RATE: 18 BRPM | TEMPERATURE: 99 F | SYSTOLIC BLOOD PRESSURE: 160 MMHG | OXYGEN SATURATION: 96 % | DIASTOLIC BLOOD PRESSURE: 95 MMHG | HEART RATE: 107 BPM

## 2018-09-07 LAB
ANION GAP SERPL CALC-SCNC: 9 MMOL/L — SIGNIFICANT CHANGE UP (ref 5–17)
BUN SERPL-MCNC: 12 MG/DL — SIGNIFICANT CHANGE UP (ref 7–23)
CALCIUM SERPL-MCNC: 8.4 MG/DL — LOW (ref 8.5–10.1)
CHLORIDE SERPL-SCNC: 104 MMOL/L — SIGNIFICANT CHANGE UP (ref 96–108)
CHOLEST SERPL-MCNC: 105 MG/DL — SIGNIFICANT CHANGE UP (ref 10–199)
CO2 SERPL-SCNC: 26 MMOL/L — SIGNIFICANT CHANGE UP (ref 22–31)
CREAT SERPL-MCNC: 1.08 MG/DL — SIGNIFICANT CHANGE UP (ref 0.5–1.3)
GLUCOSE SERPL-MCNC: 110 MG/DL — HIGH (ref 70–99)
HBA1C BLD-MCNC: 5.6 % — SIGNIFICANT CHANGE UP (ref 4–5.6)
HCT VFR BLD CALC: 34.9 % — LOW (ref 39–50)
HDLC SERPL-MCNC: 34 MG/DL — LOW
HGB BLD-MCNC: 11.6 G/DL — LOW (ref 13–17)
LIPID PNL WITH DIRECT LDL SERPL: 52 MG/DL — SIGNIFICANT CHANGE UP
MCHC RBC-ENTMCNC: 29.8 PG — SIGNIFICANT CHANGE UP (ref 27–34)
MCHC RBC-ENTMCNC: 33.2 GM/DL — SIGNIFICANT CHANGE UP (ref 32–36)
MCV RBC AUTO: 89.7 FL — SIGNIFICANT CHANGE UP (ref 80–100)
NRBC # BLD: 0 /100 WBCS — SIGNIFICANT CHANGE UP (ref 0–0)
PLATELET # BLD AUTO: 221 K/UL — SIGNIFICANT CHANGE UP (ref 150–400)
POTASSIUM SERPL-MCNC: 4 MMOL/L — SIGNIFICANT CHANGE UP (ref 3.5–5.3)
POTASSIUM SERPL-SCNC: 4 MMOL/L — SIGNIFICANT CHANGE UP (ref 3.5–5.3)
RBC # BLD: 3.89 M/UL — LOW (ref 4.2–5.8)
RBC # FLD: 13.1 % — SIGNIFICANT CHANGE UP (ref 10.3–14.5)
SODIUM SERPL-SCNC: 139 MMOL/L — SIGNIFICANT CHANGE UP (ref 135–145)
TOTAL CHOLESTEROL/HDL RATIO MEASUREMENT: 3.1 RATIO — LOW (ref 3.4–9.6)
TRIGL SERPL-MCNC: 96 MG/DL — SIGNIFICANT CHANGE UP (ref 10–149)
WBC # BLD: 9.75 K/UL — SIGNIFICANT CHANGE UP (ref 3.8–10.5)
WBC # FLD AUTO: 9.75 K/UL — SIGNIFICANT CHANGE UP (ref 3.8–10.5)

## 2018-09-07 PROCEDURE — 99239 HOSP IP/OBS DSCHRG MGMT >30: CPT

## 2018-09-07 RX ORDER — METRONIDAZOLE 500 MG
1 TABLET ORAL
Qty: 15 | Refills: 0 | OUTPATIENT
Start: 2018-09-07 | End: 2018-09-11

## 2018-09-07 RX ORDER — AMLODIPINE BESYLATE 2.5 MG/1
1 TABLET ORAL
Qty: 30 | Refills: 0 | OUTPATIENT
Start: 2018-09-07 | End: 2018-10-06

## 2018-09-07 RX ORDER — NICOTINE POLACRILEX 2 MG
1 GUM BUCCAL
Qty: 1 | Refills: 0 | OUTPATIENT
Start: 2018-09-07 | End: 2018-10-06

## 2018-09-07 RX ORDER — METOPROLOL TARTRATE 50 MG
1 TABLET ORAL
Qty: 60 | Refills: 0 | OUTPATIENT
Start: 2018-09-07 | End: 2018-10-06

## 2018-09-07 RX ORDER — MOXIFLOXACIN HYDROCHLORIDE TABLETS, 400 MG 400 MG/1
1 TABLET, FILM COATED ORAL
Qty: 10 | Refills: 0 | OUTPATIENT
Start: 2018-09-07 | End: 2018-09-11

## 2018-09-07 RX ADMIN — Medication 100 MILLIGRAM(S): at 05:12

## 2018-09-07 RX ADMIN — AMLODIPINE BESYLATE 10 MILLIGRAM(S): 2.5 TABLET ORAL at 05:12

## 2018-09-07 RX ADMIN — PIPERACILLIN AND TAZOBACTAM 25 GRAM(S): 4; .5 INJECTION, POWDER, LYOPHILIZED, FOR SOLUTION INTRAVENOUS at 05:12

## 2018-09-07 RX ADMIN — PANTOPRAZOLE SODIUM 40 MILLIGRAM(S): 20 TABLET, DELAYED RELEASE ORAL at 05:12

## 2018-09-07 RX ADMIN — Medication 50 MILLIGRAM(S): at 05:12

## 2018-09-07 RX ADMIN — Medication 10 MILLIGRAM(S): at 05:12

## 2018-09-07 NOTE — CHART NOTE - NSCHARTNOTEFT_GEN_A_CORE
To whom it may concern:    Patient Mihai Rodriguez (date of birth 1965) has been hospitalized at [facility] since 9/4/18. He is clear to return to work without restrictions on Wednesday September, 12 2018.   If I can be of any assistance  please do not hesitate to call     Avery Paris MD   [phone]

## 2018-09-07 NOTE — PROGRESS NOTE ADULT - SUBJECTIVE AND OBJECTIVE BOX
Patient is a 53y old  Male who presents with a chief complaint of lower abdominal pain (05 Sep 2018 09:57)      INTERVAL HPI/OVERNIGHT EVENTS: RRT this am for severe abdominal pain     MEDICATIONS  (STANDING):  amLODIPine   Tablet 5 milliGRAM(s) Oral daily  metroNIDAZOLE  IVPB 500 milliGRAM(s) IV Intermittent every 8 hours  nicotine - 21 mG/24Hr(s) Patch 1 patch Transdermal daily  piperacillin/tazobactam IVPB. 3.375 Gram(s) IV Intermittent every 8 hours  sodium chloride 0.9%. 1000 milliLiter(s) (100 mL/Hr) IV Continuous <Continuous>    MEDICATIONS  (PRN):  acetaminophen   Tablet .. 650 milliGRAM(s) Oral every 6 hours PRN Temp greater or equal to 38C (100.4F)  HYDROmorphone  Injectable 2 milliGRAM(s) IV Push every 4 hours PRN Severe Pain (7 - 10)  ondansetron Injectable 4 milliGRAM(s) IV Push every 6 hours PRN Nausea and/or Vomiting  oxyCODONE    5 mG/acetaminophen 325 mG 2 Tablet(s) Oral every 4 hours PRN Moderate Pain (4 - 6)      Allergies    erythromycin (Unknown)      REVIEW OF SYSTEMS:  CONSTITUTIONAL: fever   EYES: No eye pain, visual disturbances, or discharge  ENMT:  No difficulty hearing, tinnitus, vertigo; No sinus or throat pain  NECK: No pain or stiffness  RESPIRATORY: No cough, wheezing, chills or hemoptysis; No shortness of breath  CARDIOVASCULAR: No chest pain, palpitations, dizziness, or leg swelling  GASTROINTESTINAL: adbdominal pain constipation   GENITOURINARY: No dysuria, frequency, hematuria, or incontinence  NEUROLOGICAL: No headaches, memory loss, loss of strength, numbness, or tremors  SKIN: No itching, burning, rashes, or lesions   LYMPH NODES: No enlarged glands  ENDOCRINE: No heat or cold intolerance; No hair loss  MUSCULOSKELETAL: No joint pain or swelling; No muscle, back, or extremity pain  PSYCHIATRIC: No depression, anxiety, mood swings, or difficulty sleeping  HEME/LYMPH: No easy bruising, or bleeding gums  ALLERGY AND IMMUNOLOGIC: No hives or eczema    Vital Signs Last 24 Hrs  T(C): 38.4 (05 Sep 2018 11:48), Max: 38.4 (05 Sep 2018 11:48)  T(F): 101.2 (05 Sep 2018 11:48), Max: 101.2 (05 Sep 2018 11:48)  HR: 122 (05 Sep 2018 11:48) (75 - 122)  BP: 150/96 (05 Sep 2018 11:48) (88/59 - 169/114)  BP(mean): --  RR: 20 (05 Sep 2018 11:48) (16 - 20)  SpO2: 95% (05 Sep 2018 11:48) (95% - 100%)    PHYSICAL EXAM:  GENERAL: NAD, well-groomed, well-developed  HEAD:  Atraumatic, Normocephalic  EYES: EOMI, PERRLA, conjunctiva and sclera clear  ENMT: No tonsillar erythema, exudates, or enlargement; Moist mucous membranes, Good dentition, No lesions  NECK: Supple, No JVD, Normal thyroid  NERVOUS SYSTEM:  Alert & Oriented X3, Good concentration; Motor Strength 5/5 B/L upper and lower extremities; DTRs 2+ intact and symmetric  CHEST/LUNG: Clear to percussion bilaterally; No rales, rhonchi, wheezing, or rubs  HEART: Regular rate and rhythm; No murmurs, rubs, or gallops  ABDOMEN: Soft, Nontender, Nondistended; Bowel sounds present  EXTREMITIES:  2+ Peripheral Pulses, No clubbing, cyanosis, or edema  LYMPH: No lymphadenopathy noted  SKIN: No rashes or lesions    LABS:                        13.3   15.64 )-----------( 202      ( 05 Sep 2018 06:11 )             39.5     09-05    140  |  103  |  15  ----------------------------<  102<H>  4.4   |  28  |  1.08    Ca    8.7      05 Sep 2018 06:11    TPro  7.9  /  Alb  3.5  /  TBili  0.8  /  DBili  .20  /  AST  7<L>  /  ALT  20  /  AlkPhos  58  09-04    PT/INR - ( 04 Sep 2018 08:01 )   PT: 14.2 sec;   INR: 1.30 ratio         PTT - ( 04 Sep 2018 08:01 )  PTT:28.8 sec  Urinalysis Basic - ( 04 Sep 2018 10:33 )    Color: Yellow / Appearance: Clear / S.010 / pH: x  Gluc: x / Ketone: Negative  / Bili: Negative / Urobili: Negative mg/dL   Blood: x / Protein: 30 mg/dL / Nitrite: Negative   Leuk Esterase: Negative / RBC: 6-10 /HPF / WBC 0-2   Sq Epi: x / Non Sq Epi: Few / Bacteria: x      CAPILLARY BLOOD GLUCOSE      POCT Blood Glucose.: 113 mg/dL (05 Sep 2018 07:15)      RADIOLOGY & ADDITIONAL TESTS:  < from: CT Abdomen and Pelvis w/ IV Cont (18 @ 09:35) >  IMPRESSION:     Sigmoid colon diverticulitis with significant surrounding inflammatory   changes and reactive wall thickening of the cecum and terminal/distal   ileum. No drainable fluid collection or intraperitoneal free air.    Colonoscopy after acute symptoms have resolved is suggested to exclude   underlying neoplasm.    < end of copied text >    Imaging Personally Reviewed:  [ X] YES  [ ] NO    Consultant(s) Notes Reviewed:  [ X] YES  [ ] NO    Care Discussed with Consultants/Other Providers [X ] YES  [ ] NO
Patient is a 53y old  Male who presents with a chief complaint of lower abdominal pain (05 Sep 2018 13:32)      INTERVAL HPI/OVERNIGHT EVENTS:feels better still with pain very high markers of inflamation     MEDICATIONS  (STANDING):  amLODIPine   Tablet 10 milliGRAM(s) Oral daily  metoprolol tartrate 25 milliGRAM(s) Oral two times a day  metroNIDAZOLE  IVPB 500 milliGRAM(s) IV Intermittent every 8 hours  nicotine - 21 mG/24Hr(s) Patch 1 patch Transdermal daily  piperacillin/tazobactam IVPB. 3.375 Gram(s) IV Intermittent every 8 hours  potassium acid phosphate/sodium acid phosphate tablet (K-PHOS No. 2) 1 Tablet(s) Oral once    MEDICATIONS  (PRN):  acetaminophen   Tablet .. 650 milliGRAM(s) Oral every 6 hours PRN Temp greater or equal to 38C (100.4F)  HYDROmorphone  Injectable 2 milliGRAM(s) IV Push every 4 hours PRN Severe Pain (7 - 10)  ondansetron Injectable 4 milliGRAM(s) IV Push every 6 hours PRN Nausea and/or Vomiting  oxyCODONE    5 mG/acetaminophen 325 mG 2 Tablet(s) Oral every 4 hours PRN Moderate Pain (4 - 6)      Allergies    erythromycin (Unknown)    Intolerances        REVIEW OF SYSTEMS:  CONSTITUTIONAL: No fever, weight loss, or fatigue  EYES: No eye pain, visual disturbances, or discharge  ENMT:  No difficulty hearing, tinnitus, vertigo; No sinus or throat pain  NECK: No pain or stiffness  BREASTS: No pain, masses, or nipple discharge  RESPIRATORY: No cough, wheezing, chills or hemoptysis; No shortness of breath  CARDIOVASCULAR: No chest pain, palpitations, dizziness, or leg swelling  GASTROINTESTINAL: No abdominal or epigastric pain. No nausea, vomiting, or hematemesis; No diarrhea or constipation. No melena or hematochezia.  GENITOURINARY: No dysuria, frequency, hematuria, or incontinence  NEUROLOGICAL: No headaches, memory loss, loss of strength, numbness, or tremors  SKIN: No itching, burning, rashes, or lesions   LYMPH NODES: No enlarged glands  ENDOCRINE: No heat or cold intolerance; No hair loss  MUSCULOSKELETAL: No joint pain or swelling; No muscle, back, or extremity pain  PSYCHIATRIC: No depression, anxiety, mood swings, or difficulty sleeping  HEME/LYMPH: No easy bruising, or bleeding gums  ALLERGY AND IMMUNOLOGIC: No hives or eczema    Vital Signs Last 24 Hrs  T(C): 37.1 (06 Sep 2018 11:28), Max: 37.9 (05 Sep 2018 15:11)  T(F): 98.8 (06 Sep 2018 11:28), Max: 100.2 (05 Sep 2018 15:11)  HR: 113 (06 Sep 2018 11:28) (93 - 116)  BP: 147/91 (06 Sep 2018 11:28) (134/80 - 153/92)  BP(mean): --  RR: 16 (06 Sep 2018 11:28) (16 - 18)  SpO2: 94% (06 Sep 2018 11:28) (94% - 100%)    PHYSICAL EXAM:  GENERAL: NAD, well-groomed, well-developed  HEAD:  Atraumatic, Normocephalic  EYES: EOMI, PERRLA, conjunctiva and sclera clear  ENMT: No tonsillar erythema, exudates, or enlargement; Moist mucous membranes, Good dentition, No lesions  NECK: Supple, No JVD, Normal thyroid  NERVOUS SYSTEM:  Alert & Oriented X3, Good concentration; Motor Strength 5/5 B/L upper and lower extremities; DTRs 2+ intact and symmetric  CHEST/LUNG: Clear to percussion bilaterally; No rales, rhonchi, wheezing, or rubs  HEART: Regular rate and rhythm; No murmurs, rubs, or gallops  ABDOMEN: tenderness left lower quadrant  EXTREMITIES:  2+ Peripheral Pulses, No clubbing, cyanosis, or edema  LYMPH: No lymphadenopathy noted  SKIN: No rashes or lesions    LABS:                        12.2   8.57  )-----------( 201      ( 06 Sep 2018 06:20 )             37.4         139  |  104  |  15  ----------------------------<  100<H>  3.8   |  25  |  0.98    Ca    8.8      06 Sep 2018 06:20  Phos  2.4       Mg     2.1         TPro  6.7  /  Alb  2.6<L>  /  TBili  0.7  /  DBili  x   /  AST  9<L>  /  ALT  14  /  AlkPhos  48        Urinalysis Basic - ( 05 Sep 2018 19:33 )    Color: Yellow / Appearance: Clear / S.020 / pH: x  Gluc: x / Ketone: Negative  / Bili: Negative / Urobili: Negative mg/dL   Blood: x / Protein: 30 mg/dL / Nitrite: Negative   Leuk Esterase: Trace / RBC: 0-2 /HPF / WBC 0-2   Sq Epi: x / Non Sq Epi: x / Bacteria: Moderate      CAPILLARY BLOOD GLUCOSE          RADIOLOGY & ADDITIONAL TESTS:    Imaging Personally Reviewed:  [ X] YES  [ ] NO    Consultant(s) Notes Reviewed:  [ X] YES  [ ] NO    Care Discussed with Consultants/Other Providers [X ] YES  [ ] NO
Pt feeling better; decreased abd pain      MEDICATIONS  (STANDING):  amLODIPine   Tablet 10 milliGRAM(s) Oral daily  enalapril 10 milliGRAM(s) Oral two times a day  metoprolol tartrate 25 milliGRAM(s) Oral two times a day  metroNIDAZOLE  IVPB 500 milliGRAM(s) IV Intermittent every 8 hours  nicotine - 21 mG/24Hr(s) Patch 1 patch Transdermal daily  piperacillin/tazobactam IVPB. 3.375 Gram(s) IV Intermittent every 8 hours    MEDICATIONS  (PRN):  acetaminophen   Tablet .. 650 milliGRAM(s) Oral every 6 hours PRN Temp greater or equal to 38C (100.4F)  HYDROmorphone  Injectable 2 milliGRAM(s) IV Push every 4 hours PRN Severe Pain (7 - 10)  ondansetron Injectable 4 milliGRAM(s) IV Push every 6 hours PRN Nausea and/or Vomiting  oxyCODONE    5 mG/acetaminophen 325 mG 2 Tablet(s) Oral every 4 hours PRN Moderate Pain (4 - 6)      Allergies    erythromycin (Unknown)    Intolerances        Vital Signs Last 24 Hrs  T(C): 37.1 (06 Sep 2018 11:28), Max: 37.5 (06 Sep 2018 05:22)  T(F): 98.8 (06 Sep 2018 11:28), Max: 99.5 (06 Sep 2018 05:22)  HR: 108 (06 Sep 2018 12:55) (93 - 113)  BP: 163/100 (06 Sep 2018 12:55) (134/80 - 163/100)  BP(mean): --  RR: 16 (06 Sep 2018 12:55) (16 - 18)  SpO2: 96% (06 Sep 2018 12:55) (94% - 100%)    PHYSICAL EXAM:  General: NAD.  CVS: S1, S2  Chest: air entry bilaterally present  Abd: BS present, soft, non-tender      LABS:                        12.2   8.57  )-----------( 201      ( 06 Sep 2018 06:20 )             37.4     09-06    139  |  104  |  15  ----------------------------<  100<H>  3.8   |  25  |  0.98    Ca    8.8      06 Sep 2018 06:20  Phos  2.4     09-06  Mg     2.1     09-06    TPro  6.7  /  Alb  2.6<L>  /  TBili  0.7  /  DBili  x   /  AST  9<L>  /  ALT  14  /  AlkPhos  48  09-06        continue IV antibx  possible d/c home tomorrow on PO antibx  will need outpatient colonoscopy
Pt had an episode of intense LLQ abd pain this AM - now feels much better  On IV antibx      MEDICATIONS  (STANDING):  ciprofloxacin   IVPB 400 milliGRAM(s) IV Intermittent every 12 hours  metroNIDAZOLE  IVPB 500 milliGRAM(s) IV Intermittent every 8 hours  nicotine - 21 mG/24Hr(s) Patch 1 patch Transdermal daily  sodium chloride 0.9%. 1000 milliLiter(s) (100 mL/Hr) IV Continuous <Continuous>    MEDICATIONS  (PRN):  HYDROmorphone  Injectable 1 milliGRAM(s) IV Push every 4 hours PRN Moderate Pain (4 - 6)  ondansetron Injectable 4 milliGRAM(s) IV Push every 6 hours PRN Nausea and/or Vomiting      Allergies    erythromycin (Unknown)    Intolerances        Vital Signs Last 24 Hrs  T(C): 36.5 (05 Sep 2018 07:30), Max: 38.1 (04 Sep 2018 16:20)  T(F): 97.7 (05 Sep 2018 07:30), Max: 100.5 (04 Sep 2018 16:20)  HR: 88 (05 Sep 2018 07:30) (75 - 103)  BP: 148/96 (05 Sep 2018 07:30) (104/59 - 169/114)  BP(mean): --  RR: 20 (05 Sep 2018 07:30) (15 - 20)  SpO2: 100% (05 Sep 2018 07:30) (97% - 100%)    PHYSICAL EXAM:  General: NAD.  CVS: S1, S2  Chest: air entry bilaterally present  Abd: BS present, soft, tender LLQ      LABS:                        13.3   15.64 )-----------( 202      ( 05 Sep 2018 06:11 )             39.5     09-05    140  |  103  |  15  ----------------------------<  102<H>  4.4   |  28  |  1.08    Ca    8.7      05 Sep 2018 06:11    TPro  7.9  /  Alb  3.5  /  TBili  0.8  /  DBili  .20  /  AST  7<L>  /  ALT  20  /  AlkPhos  58  09-04    PT/INR - ( 04 Sep 2018 08:01 )   PT: 14.2 sec;   INR: 1.30 ratio         PTT - ( 04 Sep 2018 08:01 )  PTT:28.8 sec    Continue IV Flagyl and Cipro  clear liquids  Pt will need colonoscopy once recovered from this episode of acute diverticulitis
Pt stable -feels better  on antibx      MEDICATIONS  (STANDING):  amLODIPine   Tablet 10 milliGRAM(s) Oral daily  enalapril 10 milliGRAM(s) Oral two times a day  metoprolol tartrate 50 milliGRAM(s) Oral two times a day  metroNIDAZOLE  IVPB 500 milliGRAM(s) IV Intermittent every 8 hours  nicotine - 21 mG/24Hr(s) Patch 1 patch Transdermal daily  pantoprazole    Tablet 40 milliGRAM(s) Oral before breakfast  piperacillin/tazobactam IVPB. 3.375 Gram(s) IV Intermittent every 8 hours    MEDICATIONS  (PRN):  acetaminophen   Tablet .. 650 milliGRAM(s) Oral every 6 hours PRN Temp greater or equal to 38C (100.4F)  HYDROmorphone  Injectable 2 milliGRAM(s) IV Push every 4 hours PRN Severe Pain (7 - 10)  ondansetron Injectable 4 milliGRAM(s) IV Push every 6 hours PRN Nausea and/or Vomiting  oxyCODONE    5 mG/acetaminophen 325 mG 2 Tablet(s) Oral every 4 hours PRN Moderate Pain (4 - 6)      Allergies    erythromycin (Unknown)    Intolerances        Vital Signs Last 24 Hrs  T(C): 37.1 (07 Sep 2018 11:53), Max: 39.4 (06 Sep 2018 16:45)  T(F): 98.8 (07 Sep 2018 11:53), Max: 103 (06 Sep 2018 16:45)  HR: 107 (07 Sep 2018 11:53) (99 - 118)  BP: 160/95 (07 Sep 2018 11:53) (155/102 - 192/109)  BP(mean): --  RR: 18 (07 Sep 2018 11:53) (18 - 21)  SpO2: 96% (07 Sep 2018 11:53) (95% - 96%)    PHYSICAL EXAM:  General: NAD.  CVS: S1, S2  Chest: air entry bilaterally present  Abd: BS present, soft, non-tender      LABS:                        11.6   9.75  )-----------( 221      ( 07 Sep 2018 06:18 )             34.9     09-07    139  |  104  |  12  ----------------------------<  110<H>  4.0   |  26  |  1.08    Ca    8.4<L>      07 Sep 2018 06:18  Phos  2.4     09-06  Mg     2.1     09-06    TPro  6.7  /  Alb  2.6<L>  /  TBili  0.7  /  DBili  x   /  AST  9<L>  /  ALT  14  /  AlkPhos  48  09-06        Pt will likely be discharged home on PO antibx  Pt instructed to f/u in office and will need colonoscopy

## 2018-09-07 NOTE — DISCHARGE NOTE ADULT - PATIENT PORTAL LINK FT
You can access the [facility] Patient Portal, offered by [facility], by registering with the following website: [email or web address]

## 2018-09-07 NOTE — DISCHARGE NOTE ADULT - MEDICATION SUMMARY - MEDICATIONS TO TAKE
I will START or STAY ON the medications listed below when I get home from the hospital:    Flagyl 500 mg oral tablet  -- 1 tab(s) by mouth 3 times a day   -- Do not drink alcoholic beverages when taking this medication.  Finish all this medication unless otherwise directed by prescriber.  May discolor urine or feces.    -- Indication: For Diverticulitis    enalapril 10 mg oral tablet  -- 1 tab(s) by mouth 2 times a day  -- Indication: For Essential hypertension    metoprolol tartrate 50 mg oral tablet  -- 1 tab(s) by mouth 2 times a day  -- Indication: For Essential hypertension    amLODIPine 10 mg oral tablet  -- 1 tab(s) by mouth once a day  -- Indication: For Essential hypertension    Cipro 500 mg oral tablet  -- 1 tab(s) by mouth 2 times a day   -- Avoid prolonged or excessive exposure to direct and/or artificial sunlight while taking this medication.  Check with your doctor before becoming pregnant.  Do not take dairy products, antacids, or iron preparations within one hour of this medication.  Finish all this medication unless otherwise directed by prescriber.  Medication should be taken with plenty of water.    -- Indication: For Diverticulitis    nicotine 21 mg/24 hr transdermal film, extended release  -- 1 film(s) by transdermal patch once a day   -- Indication: For tobacco  abuse

## 2018-09-07 NOTE — PROGRESS NOTE ADULT - REASON FOR ADMISSION
lower abdominal pain

## 2018-09-07 NOTE — DISCHARGE NOTE ADULT - CARE PROVIDERS DIRECT ADDRESSES
[email or web address]

## 2018-09-07 NOTE — DISCHARGE NOTE ADULT - PLAN OF CARE
please continue 5 days of antibiotics you will need a colonoscopy please start your new blood pressure medications please take these blood pressure medications and find a new primary doctor please stop smoking nicotine patch sent to pharmacy

## 2018-09-07 NOTE — DISCHARGE NOTE ADULT - CARE PROVIDER_API CALL
Dmitri Blair), Medicine  [address]  [address]  [place] [address]  Phone: [phone]  Fax: [phone]    Fortunato Chavez), Internal Medicine  [address]  [place] [address]  Phone: [phone]  Fax: [phone]

## 2018-09-07 NOTE — DISCHARGE NOTE ADULT - HOSPITAL COURSE
History of Present Illness:  Reason for Admission: lower abdominal pain	  History of Present Illness: 	   53 year old male presents today c/o abdominal pain which started since Sunday, he describes pain rated 7/10 in his lower abdomen associated with bloating and chills  (-) nausea or vomiting (-) h/o kidney stones (-) hematuria (-) urinary symptoms (-) fevers or chills-n the emergency room he was found to have markedly elevated b/p - denies chest pain or short of breath or headache or bright red blood per rectum   PHYSICAL EXAM:  GENERAL: NAD, well-groomed, well-developed  HEAD:  Atraumatic, Normocephalic  EYES: EOMI, PERRLA, conjunctiva and sclera clear  ENMT: No tonsillar erythema, exudates, or enlargement; Moist mucous membranes, Good dentition, No lesions  NECK: Supple, No JVD, Normal thyroid  NERVOUS SYSTEM:  Alert & Oriented X3, Good concentration; Motor Strength 5/5 B/L upper and lower extremities; DTRs 2+ intact and symmetric  CHEST/LUNG: Clear to percussion bilaterally; No rales, rhonchi, wheezing, or rubs  HEART: Regular rate and rhythm; No murmurs, rubs, or gallops  ABDOMEN: tenderness left lower quadrant  EXTREMITIES:  2+ Peripheral Pulses, No clubbing, cyanosis, or edema  LYMPH: No lymphadenopathy noted  SKIN: No rashes or lesions    Assessment and Plan:   · Assessment		  53 M with diverticulitis fe4ver and pain may suggest complicated course. On antibiotics  with pain control. Appreciate GI consult   improved today high makers of inflammation will need colonoscopy with biopsy as outpatient       Problem/Plan - 1:  ·  Problem: Diverticulitis.  Plan: Zosyn and Flagyl IV  gastroenterologist consult appreciated   now on diet   IV Fluid hydration  will need colonoscopy as outpatient.      Problem/Plan - 2:  ·  Problem: Essential hypertension.  Plan: uncontrolled with high diastolic continue to monitor   Norvasc 10mg po Daily  Metoprolol 25mg po BID.  enalapril 10mg po BID       Problem/Plan - 3:  ·  Problem: Preventive measure.  Plan: low risk free ambulation for DVT prophylaxis.     Attending Attestation:   I was physically present for the key portions of the evaluation and management (E/M) service provided.  I agree with the above history, physical, and plan which I have reviewed and edited where appropriate.     45 minutes spent on total discharge

## 2018-09-07 NOTE — DISCHARGE NOTE ADULT - CARE PLAN
Principal Discharge DX:	Diverticulitis  Goal:	please continue 5 days of antibiotics  Assessment and plan of treatment:	you will need a colonoscopy  Secondary Diagnosis:	Essential hypertension  Goal:	please start your new blood pressure medications  Assessment and plan of treatment:	please take these blood pressure medications and find a new primary doctor  Secondary Diagnosis:	Tobacco abuse  Goal:	please stop smoking  Assessment and plan of treatment:	nicotine patch sent to pharmacy

## 2018-09-10 LAB
CULTURE RESULTS: SIGNIFICANT CHANGE UP
CULTURE RESULTS: SIGNIFICANT CHANGE UP
SPECIMEN SOURCE: SIGNIFICANT CHANGE UP
SPECIMEN SOURCE: SIGNIFICANT CHANGE UP

## 2018-09-12 DIAGNOSIS — Z88.1 ALLERGY STATUS TO OTHER ANTIBIOTIC AGENTS STATUS: ICD-10-CM

## 2018-09-12 DIAGNOSIS — Z28.21 IMMUNIZATION NOT CARRIED OUT BECAUSE OF PATIENT REFUSAL: ICD-10-CM

## 2018-09-12 DIAGNOSIS — K57.32 DIVERTICULITIS OF LARGE INTESTINE WITHOUT PERFORATION OR ABSCESS WITHOUT BLEEDING: ICD-10-CM

## 2018-09-12 DIAGNOSIS — R10.9 UNSPECIFIED ABDOMINAL PAIN: ICD-10-CM

## 2018-09-12 DIAGNOSIS — I10 ESSENTIAL (PRIMARY) HYPERTENSION: ICD-10-CM

## 2018-09-12 DIAGNOSIS — F17.210 NICOTINE DEPENDENCE, CIGARETTES, UNCOMPLICATED: ICD-10-CM
